# Patient Record
Sex: FEMALE | Race: WHITE
[De-identification: names, ages, dates, MRNs, and addresses within clinical notes are randomized per-mention and may not be internally consistent; named-entity substitution may affect disease eponyms.]

---

## 2018-03-10 ENCOUNTER — HOSPITAL ENCOUNTER (EMERGENCY)
Dept: HOSPITAL 4 - SED | Age: 78
Discharge: HOME | End: 2018-03-10
Payer: COMMERCIAL

## 2018-03-10 VITALS — SYSTOLIC BLOOD PRESSURE: 145 MMHG

## 2018-03-10 VITALS — HEIGHT: 69 IN | BODY MASS INDEX: 21.48 KG/M2 | WEIGHT: 145 LBS

## 2018-03-10 VITALS — SYSTOLIC BLOOD PRESSURE: 160 MMHG

## 2018-03-10 DIAGNOSIS — N39.0: Primary | ICD-10-CM

## 2018-03-10 DIAGNOSIS — R74.8: ICD-10-CM

## 2018-03-10 DIAGNOSIS — I10: ICD-10-CM

## 2018-03-10 DIAGNOSIS — Z90.710: ICD-10-CM

## 2018-03-10 DIAGNOSIS — J18.9: ICD-10-CM

## 2018-03-10 LAB
ALBUMIN SERPL BCP-MCNC: 3.9 G/DL (ref 3.4–4.8)
ALT SERPL W P-5'-P-CCNC: 23 U/L (ref 12–78)
AMYLASE SERPL-CCNC: 80 U/L (ref 0–100)
ANION GAP SERPL CALCULATED.3IONS-SCNC: 11 MMOL/L (ref 5–15)
APPEARANCE UR: (no result)
AST SERPL W P-5'-P-CCNC: 17 U/L (ref 10–37)
BACTERIA URNS QL MICRO: (no result) /HPF
BASOPHILS # BLD AUTO: 0.1 K/UL (ref 0–0.2)
BASOPHILS NFR BLD AUTO: 1.8 % (ref 0–2)
BILIRUB SERPL-MCNC: 0.2 MG/DL (ref 0–1)
BILIRUB UR QL STRIP: NEGATIVE
BUN SERPL-MCNC: 29 MG/DL (ref 8–21)
CALCIUM SERPL-MCNC: 9.9 MG/DL (ref 8.4–11)
CHLORIDE SERPL-SCNC: 104 MMOL/L (ref 98–107)
COLOR UR: YELLOW
CREAT SERPL-MCNC: 1.28 MG/DL (ref 0.55–1.3)
EOSINOPHIL # BLD AUTO: 0.2 K/UL (ref 0–0.4)
EOSINOPHIL NFR BLD AUTO: 3.3 % (ref 0–4)
ERYTHROCYTE [DISTWIDTH] IN BLOOD BY AUTOMATED COUNT: 12.1 % (ref 9–15)
GFR SERPL CREATININE-BSD FRML MDRD: (no result) ML/MIN (ref 90–?)
GLUCOSE SERPL-MCNC: 96 MG/DL (ref 70–99)
GLUCOSE UR STRIP-MCNC: NEGATIVE MG/DL
HCT VFR BLD AUTO: 41.2 % (ref 36–48)
HGB BLD-MCNC: 13.8 G/DL (ref 12–16)
HGB UR QL STRIP: (no result)
INR PPP: 1.1 (ref 0.8–1.2)
KETONES UR STRIP-MCNC: NEGATIVE MG/DL
LEUKOCYTE ESTERASE UR QL STRIP: (no result)
LIPASE SERPL-CCNC: 958 U/L (ref 73–393)
LYMPHOCYTES # BLD AUTO: 1.5 K/UL (ref 1–5.5)
LYMPHOCYTES NFR BLD AUTO: 26.6 % (ref 20.5–51.5)
MCH RBC QN AUTO: 34 PG (ref 27–31)
MCHC RBC AUTO-ENTMCNC: 34 % (ref 32–36)
MCV RBC AUTO: 102 FL (ref 79–98)
MONOCYTES # BLD MANUAL: 0.6 K/UL (ref 0–1)
MONOCYTES # BLD MANUAL: 9.9 % (ref 1.7–9.3)
NEUTROPHILS # BLD AUTO: 3.3 K/UL (ref 1.8–7.7)
NEUTROPHILS NFR BLD AUTO: 58.4 % (ref 40–70)
NITRITE UR QL STRIP: NEGATIVE
PH UR STRIP: 5 [PH] (ref 5–8)
PLATELET # BLD AUTO: 181 K/UL (ref 130–430)
POTASSIUM SERPL-SCNC: 4.2 MMOL/L (ref 3.5–5.1)
PROT UR QL STRIP: NEGATIVE
PROTHROMBIN TIME: 11 SECS (ref 9.5–12.5)
RBC # BLD AUTO: 4.02 MIL/UL (ref 4.2–6.2)
RBC #/AREA URNS HPF: (no result) /HPF (ref 0–3)
SODIUM SERPLBLD-SCNC: 137 MMOL/L (ref 136–145)
SP GR UR STRIP: <1.005 (ref 1–1.03)
UROBILINOGEN UR STRIP-MCNC: 0.2 MG/DL (ref 0.2–1)
WBC # BLD AUTO: 5.7 K/UL (ref 4.8–10.8)
WBC #/AREA URNS HPF: (no result) /HPF (ref 0–3)

## 2018-03-10 PROCEDURE — 96375 TX/PRO/DX INJ NEW DRUG ADDON: CPT

## 2018-03-10 PROCEDURE — 74176 CT ABD & PELVIS W/O CONTRAST: CPT

## 2018-03-10 PROCEDURE — 83690 ASSAY OF LIPASE: CPT

## 2018-03-10 PROCEDURE — 36415 COLL VENOUS BLD VENIPUNCTURE: CPT

## 2018-03-10 PROCEDURE — 87086 URINE CULTURE/COLONY COUNT: CPT

## 2018-03-10 PROCEDURE — 71045 X-RAY EXAM CHEST 1 VIEW: CPT

## 2018-03-10 PROCEDURE — 99285 EMERGENCY DEPT VISIT HI MDM: CPT

## 2018-03-10 PROCEDURE — 87186 SC STD MICRODIL/AGAR DIL: CPT

## 2018-03-10 PROCEDURE — 85730 THROMBOPLASTIN TIME PARTIAL: CPT

## 2018-03-10 PROCEDURE — 81000 URINALYSIS NONAUTO W/SCOPE: CPT

## 2018-03-10 PROCEDURE — 82550 ASSAY OF CK (CPK): CPT

## 2018-03-10 PROCEDURE — 85025 COMPLETE CBC W/AUTO DIFF WBC: CPT

## 2018-03-10 PROCEDURE — 85610 PROTHROMBIN TIME: CPT

## 2018-03-10 PROCEDURE — 84484 ASSAY OF TROPONIN QUANT: CPT

## 2018-03-10 PROCEDURE — 80053 COMPREHEN METABOLIC PANEL: CPT

## 2018-03-10 PROCEDURE — 96374 THER/PROPH/DIAG INJ IV PUSH: CPT

## 2018-03-10 PROCEDURE — 82150 ASSAY OF AMYLASE: CPT

## 2018-03-10 NOTE — NUR
Patient given written and verbal discharge instructions and verbalizes 
understanding.  ER MD discussed with patient the results and treatment 
provided. Patient in stable condition. ID arm band removed. IV catheter removed 
intact and dressing applied, no active bleeding.

Rx of Levaquin and ibuprofen given. Patient educated on pain management and to 
follow up with PMD. Pain Scale 0/10.

Opportunity for questions provided and answered. Medication side effect fact 
sheet provided.

## 2018-03-10 NOTE — NUR
Placed in room 06  . Placed on cardiac monitor, blood pressure machine and 
pulse oximeter. To gown for exam. Side rails up. Report given to Micaela FERNANDEZ.

## 2018-03-10 NOTE — NUR
Pt AAOx4 ambulated into ED c/o R flank pain radiating to R upper back x 2 
weeks. Pt states "I thought it would get better, but the pain is especially 
severe today." Pt took two tylenol this morning with relief, but pain has 
returned. Pt denies N/V/D. No deformities noted on site. Pt speaking in clear 
sentences, breathing even and unlabored. No other injuries/complaints per 
pt/noted. Will continue to monitor.

## 2018-03-11 ENCOUNTER — HOSPITAL ENCOUNTER (EMERGENCY)
Dept: HOSPITAL 4 - SED | Age: 78
Discharge: HOME | End: 2018-03-11
Payer: COMMERCIAL

## 2018-03-11 VITALS — WEIGHT: 145 LBS | BODY MASS INDEX: 21.48 KG/M2 | HEIGHT: 69 IN

## 2018-03-11 VITALS — SYSTOLIC BLOOD PRESSURE: 167 MMHG

## 2018-03-11 VITALS — SYSTOLIC BLOOD PRESSURE: 142 MMHG

## 2018-03-11 DIAGNOSIS — M94.0: Primary | ICD-10-CM

## 2018-03-11 DIAGNOSIS — I10: ICD-10-CM

## 2018-03-11 PROCEDURE — 93005 ELECTROCARDIOGRAM TRACING: CPT

## 2018-03-11 PROCEDURE — 99283 EMERGENCY DEPT VISIT LOW MDM: CPT

## 2020-12-19 ENCOUNTER — HOSPITAL ENCOUNTER (EMERGENCY)
Dept: HOSPITAL 4 - SED | Age: 80
Discharge: HOME | End: 2020-12-19
Payer: COMMERCIAL

## 2020-12-19 VITALS — SYSTOLIC BLOOD PRESSURE: 169 MMHG

## 2020-12-19 VITALS — SYSTOLIC BLOOD PRESSURE: 222 MMHG

## 2020-12-19 VITALS — WEIGHT: 130 LBS | HEIGHT: 69 IN | BODY MASS INDEX: 19.26 KG/M2

## 2020-12-19 DIAGNOSIS — E78.5: ICD-10-CM

## 2020-12-19 DIAGNOSIS — I10: ICD-10-CM

## 2020-12-19 DIAGNOSIS — Z86.73: ICD-10-CM

## 2020-12-19 DIAGNOSIS — R90.82: ICD-10-CM

## 2020-12-19 DIAGNOSIS — I16.0: Primary | ICD-10-CM

## 2020-12-19 LAB
ALBUMIN SERPL BCP-MCNC: 3.6 G/DL (ref 3.4–4.8)
ALT SERPL W P-5'-P-CCNC: 21 U/L (ref 12–78)
ANION GAP SERPL CALCULATED.3IONS-SCNC: 9 MMOL/L (ref 5–15)
AST SERPL W P-5'-P-CCNC: 19 U/L (ref 10–37)
BASOPHILS # BLD AUTO: 0 K/UL (ref 0–0.2)
BASOPHILS NFR BLD AUTO: 0.5 % (ref 0–2)
BILIRUB SERPL-MCNC: 0.5 MG/DL (ref 0–1)
BUN SERPL-MCNC: 22 MG/DL (ref 8–21)
CALCIUM SERPL-MCNC: 9.3 MG/DL (ref 8.4–11)
CHLORIDE SERPL-SCNC: 105 MMOL/L (ref 98–107)
CREAT SERPL-MCNC: 1.03 MG/DL (ref 0.55–1.3)
EOSINOPHIL # BLD AUTO: 0.1 K/UL (ref 0–0.4)
EOSINOPHIL NFR BLD AUTO: 1.2 % (ref 0–4)
ERYTHROCYTE [DISTWIDTH] IN BLOOD BY AUTOMATED COUNT: 13.2 % (ref 9–15)
GFR SERPL CREATININE-BSD FRML MDRD: (no result) ML/MIN (ref 90–?)
GLUCOSE SERPL-MCNC: 102 MG/DL (ref 70–99)
HCT VFR BLD AUTO: 44.9 % (ref 36–48)
HGB BLD-MCNC: 14.8 G/DL (ref 12–16)
LYMPHOCYTES # BLD AUTO: 1.1 K/UL (ref 1–5.5)
LYMPHOCYTES NFR BLD AUTO: 22.4 % (ref 20.5–51.5)
MCH RBC QN AUTO: 36 PG (ref 27–31)
MCHC RBC AUTO-ENTMCNC: 33 % (ref 32–36)
MCV RBC AUTO: 108 FL (ref 79–98)
MONOCYTES # BLD MANUAL: 0.5 K/UL (ref 0–1)
MONOCYTES # BLD MANUAL: 9.2 % (ref 1.7–9.3)
NEUTROPHILS # BLD AUTO: 3.3 K/UL (ref 1.8–7.7)
NEUTROPHILS NFR BLD AUTO: 66.7 % (ref 40–70)
PLATELET # BLD AUTO: 127 K/UL (ref 130–430)
POTASSIUM SERPL-SCNC: 3.7 MMOL/L (ref 3.5–5.1)
RBC # BLD AUTO: 4.16 MIL/UL (ref 4.2–6.2)
SODIUM SERPLBLD-SCNC: 141 MMOL/L (ref 136–145)
WBC # BLD AUTO: 5 K/UL (ref 4.8–10.8)

## 2020-12-19 PROCEDURE — 36415 COLL VENOUS BLD VENIPUNCTURE: CPT

## 2020-12-19 PROCEDURE — 85025 COMPLETE CBC W/AUTO DIFF WBC: CPT

## 2020-12-19 PROCEDURE — 80053 COMPREHEN METABOLIC PANEL: CPT

## 2020-12-19 PROCEDURE — 76376 3D RENDER W/INTRP POSTPROCES: CPT

## 2020-12-19 PROCEDURE — 70450 CT HEAD/BRAIN W/O DYE: CPT

## 2020-12-19 PROCEDURE — 96374 THER/PROPH/DIAG INJ IV PUSH: CPT

## 2020-12-19 PROCEDURE — 84484 ASSAY OF TROPONIN QUANT: CPT

## 2020-12-19 PROCEDURE — 99285 EMERGENCY DEPT VISIT HI MDM: CPT

## 2020-12-19 PROCEDURE — 93005 ELECTROCARDIOGRAM TRACING: CPT
